# Patient Record
Sex: FEMALE | URBAN - METROPOLITAN AREA
[De-identification: names, ages, dates, MRNs, and addresses within clinical notes are randomized per-mention and may not be internally consistent; named-entity substitution may affect disease eponyms.]

---

## 2018-01-03 ENCOUNTER — APPOINTMENT (RX ONLY)
Dept: URBAN - METROPOLITAN AREA CLINIC 299 | Facility: CLINIC | Age: 48
Setting detail: DERMATOLOGY
End: 2018-01-03

## 2018-01-03 DIAGNOSIS — Z02.9 ENCOUNTER FOR ADMINISTRATIVE EXAMINATIONS, UNSPECIFIED: ICD-10-CM

## 2018-01-03 PROCEDURE — ? NO SHOW PLAN

## 2019-01-16 ENCOUNTER — HOSPITAL ENCOUNTER (OUTPATIENT)
Dept: HOSPITAL 89 - MAMO | Age: 49
End: 2019-01-16
Attending: FAMILY MEDICINE
Payer: COMMERCIAL

## 2019-01-16 DIAGNOSIS — Z12.31: Primary | ICD-10-CM

## 2019-01-16 DIAGNOSIS — Z98.82: ICD-10-CM

## 2019-01-16 PROCEDURE — 77063 BREAST TOMOSYNTHESIS BI: CPT

## 2019-01-16 PROCEDURE — 77067 SCR MAMMO BI INCL CAD: CPT

## 2019-01-17 NOTE — RADIOLOGY IMAGING REPORT
FACILITY: [facility] 

 

PATIENT NAME: JUN HERNÁNDEZ

: [id number]

MR: [id number]

V: [id number]

EXAM DATE: [id number]

ORDERING PHYSICIAN: ALEJO CLINE

TECHNOLOGIST: Maddison Boss

 

PROCEDURE:BILATERAL DIGITAL SCREENING MAMMOGRAM WITH CAD ASSISTED 

INTERPRETATION & 3D TOMOSYNTHESIS

 

COMPARISON:Prior mammograms dated 11/15/17, 16, 10/7/15, 10/3/14, 

10/1/13

 

INDICATIONS:SCREENING

 

FINDINGS:  

There are bilateral subpectoral breast implants in place. There is no 

demonstration of implant rupture or leakage. The breasts are 

heterogeneously dense which can obscure small masses. The parenchymal 

pattern has remained stable allowing for difference in mammographic 

technique & patient positioning. There is no evidence of malignant 

appearing mass, malignant appearing calcification or other secondary 

sign of malignancy in either breast. 

 

DIAGNOSTIC CATEGORY 2--BENIGN FINDING.  

 

RECOMMENDATIONS:

ROUTINE MAMMOGRAM AND CLINICAL EVALUATION.   

 

IMPRESSION: 

BIRADS 2: Benign finding.

No significant abnormality is seen.

 

 

 

 

 

 

 

 

 

 

 

 

Dictated by:  Qi Wilde M.D. on 2019 at 10:26   

Transcribed by: MANDEEP on 2019 at 13:53    

Approved by:  Qi Wilde M.D. on 2019 at 8:30   

[facility]